# Patient Record
(demographics unavailable — no encounter records)

---

## 2025-02-26 NOTE — DISCUSSION/SUMMARY
[FreeTextEntry1] : MIGUEL HART  is a 88 year F  who presents today in clinical follow-up with the above history and the following active issues.   Sinus bradycardia.  Increasing fatigue and mild intermittent dizziness.  Event monitor for 7 days recommended.  Reviewed possible need for intervention.  She is not on any medications.  Reviewed labs which were done recently if thyroid profile was not done then would recommend her to have that done.  Coronary artery disease. Prior PCI.  Probably noncardiac symptoms.  EKG no acute changes. Most recent echocardiogram and myocardial perfusion scan reviewed.  Good prognostic information discussed.  Limitation of the test reviewed.  Need for continue lifestyle and risk factor modifications.  Essential hypertension, Much better control.  Continue amlodipine 5 mg twice daily.  Low sodium diet. Increase cardiovascular activity.   Hyperlipidemia. Tolerating low dose of statin therapy with improved LDL cholesterol.  Low cholesterol diet. Lifestyle and risk factor modification. Follow-up fasting lipid panel.  Hyperparathyroidism being followed with your office.   depression ; continue zoloft f/u with PMD/psychologist.    Limitations of non-invasive testing reviewed. Red flag symptoms which would warrant sooner emergent evaluation reviewed with the patient.  Questions and concerns were addressed and answered.   Counseling regarding low saturated fat,salt and carbohydrate intake was reviewed. Active lifestyle and regular exercise  along with weight management is advised.  I have reviewed above at length. I answered all the questions. Patient verbalized understandings. Thank you very much for allowing me to participate in your patient's care. Please feel free to call me for any questions. Sincerely,  Nael Marti MD, FACC, SAMARIA [EKG obtained to assist in diagnosis and management of assessed problem(s)] : EKG obtained to assist in diagnosis and management of assessed problem(s)

## 2025-02-26 NOTE — REASON FOR VISIT
[Symptom and Test Evaluation] : symptom and test evaluation [Hyperlipidemia] : hyperlipidemia [Hypertension] : hypertension [Coronary Artery Disease] : coronary artery disease [FreeTextEntry3] : Dr. Nuñez

## 2025-02-26 NOTE — ASSESSMENT
[FreeTextEntry1] : Reviewed on August 5, 2022 EKG as noted above Most recent labs August 3, 2022 stable CBC CMP and lipid panel.  I PTH elevated.  Calcium level normal.  reviewed 2/20/24  ekg reviewed last labs reviewed.   Reviewed on February 26, 2025. EKG from today reviewed. Recent labs done at Dr. Nuñez's office are requested.

## 2025-02-26 NOTE — REVIEW OF SYSTEMS
[Feeling Fatigued] : feeling fatigued [Joint Pain] : joint pain [Joint Stiffness] : joint stiffness [Numbness (Hypoesthesia)] : numbness [Tingling (Paresthesia)] : tingling [Negative] : Heme/Lymph [SOB] : no shortness of breath [Dyspnea on exertion] : not dyspnea during exertion [Chest Discomfort] : no chest discomfort [Lower Ext Edema] : no extremity edema [Leg Claudication] : no intermittent leg claudication [Palpitations] : no palpitations [Orthopnea] : no orthopnea [PND] : no PND [Syncope] : no syncope [Abdominal Pain] : no abdominal pain [Heartburn] : no heartburn [de-identified] : Both hands

## 2025-02-26 NOTE — PHYSICAL EXAM
[Well Developed] : well developed [No Acute Distress] : no acute distress [Normal Venous Pressure] : normal venous pressure [Normal S1, S2] : normal S1, S2 [Murmur] : murmur [Clear Lung Fields] : clear lung fields [Abnormal Gait] : abnormal gait [No Edema] : no edema

## 2025-02-26 NOTE — CARDIOLOGY SUMMARY
[de-identified] : August 5, 2022.  Normal sinus rhythm nonspecific ST-T changes February 20, 2024.  Normal sinus rhythm.  Nonspecific ST-T changes.  February 26, 2025.  Sinus bradycardia [de-identified] : Nuclear myocardial perfusion scan.  October 19, 2021.  Nonischemic.  LV ejection fraction greater than 70% Myocardial perfusion imaging November 18, 2015, patient exercised for 6 minutes 49 seconds the protocol.  No evidence of ischemia by EKG.  Normal myocardial perfusion and wall motion.  [de-identified] : October 19, 2021.  EF 55% mild mitral regurgitation mild segmental wall motion abnormality.  RVSP 22 mmHg. Echocardiogram December 11, 2018 showed preserved LV systolic function Echocardiogram. In November 7, 2017. Ejection fraction 60%. Normal chamber dimensions. Minimal mitral regurgitation. March 7, 2024 EF 65% mildly dilated left atrium trace MR

## 2025-04-09 NOTE — DISCUSSION/SUMMARY
[FreeTextEntry1] : MIGUEL HART is a 88 year old F who presents today Apr 09, 2025 with the above history and the following active issues:  Reports intermittent chest pains 2/10. Unable to describe if it is exertional or not. There is TRAMMELL. There is imbalance. Lightheadedness improving. Reports easy bruising. Plan: Obtain 2d echocardiogram to evaluate resting heart structure, valvular function, and LVEF. Obtain carotid ultrasound to evaluate for evidence of significant carotid atherosclerosis or obstruction. Pharm nuke on meds. Patient cannot walk on a treadmill. Take aspirin every other day. Increase Rosuvastatin to 20mg daily with fasting labs in 2 months. Patient requesting parathyroid level. See Dr. Marti to review.  Sinus bradycardia. Increasing fatigue and mild intermittent dizziness. Zio with the above findings.  Coronary artery disease. Prior PCI. Probably noncardiac symptoms. EKG no acute changes. Most recent echocardiogram and myocardial perfusion scan reviewed. Good prognostic information discussed. Limitation of the test reviewed. Need for continue lifestyle and risk factor modifications. ASA and statin as above.  Essential hypertension: Cont Amlodipine BID.  Hyperlipidemia. Statin as above.  Hyperparathyroidism being followed with your office.  depression ; continue Zoloft f/u with PMD/psychologist.  Ongoing f/u with PCP.  F/U after testing to review results. Discussed red flag symptoms, which would warrant sooner or emergent medical evaluation. Any questions and concerns were addressed and resolved.  Sincerely, Jeny Malone [facility], [place] Patient's history, testing, and plan was reviewed with supervising physician, Dr. Nael Marti

## 2025-04-09 NOTE — HISTORY OF PRESENT ILLNESS
[FreeTextEntry1] : MIGUEL HART is a 88 year old female with a past medical history of:  Coronary artery disease, status post PCI to RCA in 2005, stable anginal symptoms, offers no worsening symptoms. No worsening of her anginal symptoms. No chest pain, PND or orthopnea. No shortness of breath.  Essential hypertension, no chf, no ckd, non smoker, no recent changes in the medication.  Hyperglycemia, pre-diabetic, diet controlled, unclear hemoglobin A1c, being followed in your office.  Gastroesophageal reflux disease, has not seen gastroenterologist though on Protonix 40 mg her symptoms have improved significantly.  Hyperparathyroidism, being followed in your office.  Dyslipidemia, on rosuvastatin at 10 mg.  depression on SSRI  Mechanical falls  Last seen 2/26/25. Event monitor rec. Labs rec. See details below. In the interim there have been no hospitalizations or procedures. Reports intermittent chest pains 2/10. Unable to describe if it is exertional or not. There is TRAMMELL. There is imbalance. Lightheadedness improving. Reports easy bruising. Denies palpitations, syncope, claudication, and edema. Former smoker. Walks with walker.  Testing:  Zio 2/2025: SR average HR 62 bpm. 4 runs of AT, longest 6 beats. PAC/PVC burden <1%. Multifocal PVCs. Sequential V ectop/idioventricular rhythm 5 beats. Sx's with SB 55 and PACs.  Echo March 7, 2024 CONCLUSIONS: 1. Left ventricular systolic function is normal with an ejection fraction visually estimated at 65 %. 2. The left ventricular diastolic function is indeterminate. 3. The left atrium is mildly dilated. 4. Trace mitral regurgitation. 5. Mild pulmonic regurgitation. 6. Trace aortic regurgitation. 7. Trace tricuspid regurgitation. 8. Compared to the transthoracic echocardiogram performed on 10/19/2021, no wall motion abnormalities  noted.  Labs 1/2025: WBC 7.89, Hgb 14, HCT 43.6, plt 230, Na 143, K 4.5, Cr 0.97, Ca 10.3, AST 28, ALT 20, Trigs 115, , LDL 93, Chol 228, Uric acid 4.9, A1C 5.8, CRP 0.19, TSH 2.28.CHATO positive  Nuclear myocardial perfusion scan. October 19, 2021. Nonischemic. LV ejection fraction greater than 70%.  US 2020 Negative for BLLE DVT.  Carotid u/s 2015: Mild nonbs carotid dz BL.  US 2013: No abd aortic aneurysm noted. No prominent or mobile plaque noted.  CTA 2007: Nonobs atherosclerotic dz is noted in the LAD, circumflex, and right coronary arteries as described, see report. Mid RCA stent is patent.

## 2025-04-09 NOTE — DISCUSSION/SUMMARY
[FreeTextEntry1] : MIGUEL HART is a 88 year old F who presents today Apr 09, 2025 with the above history and the following active issues:  Reports intermittent chest pains 2/10. Unable to describe if it is exertional or not. There is TRAMMELL. There is imbalance. Lightheadedness improving. Reports easy bruising. Plan: Obtain 2d echocardiogram to evaluate resting heart structure, valvular function, and LVEF. Obtain carotid ultrasound to evaluate for evidence of significant carotid atherosclerosis or obstruction. Pharm nuke on meds. Patient cannot walk on a treadmill. Take aspirin every other day. Increase Rosuvastatin to 20mg daily with fasting labs in 2 months. Patient requesting parathyroid level. See Dr. Marti to review.  Sinus bradycardia. Increasing fatigue and mild intermittent dizziness. Zio with the above findings.  Coronary artery disease. Prior PCI. Probably noncardiac symptoms. EKG no acute changes. Most recent echocardiogram and myocardial perfusion scan reviewed. Good prognostic information discussed. Limitation of the test reviewed. Need for continue lifestyle and risk factor modifications. ASA and statin as above.  Essential hypertension: Cont Amlodipine BID.  Hyperlipidemia. Statin as above.  Hyperparathyroidism being followed with your office.  depression ; continue Zoloft f/u with PMD/psychologist.  Ongoing f/u with PCP.  F/U after testing to review results. Discussed red flag symptoms, which would warrant sooner or emergent medical evaluation. Any questions and concerns were addressed and resolved.  Sincerely, Jeny Malone [facility] Patient's history, testing, and plan was reviewed with supervising physician, Dr. Nael Marti

## 2025-04-09 NOTE — ADDENDUM
[FreeTextEntry1] : Seen and examined with cardio ACP.  Above assessment plan reviewed and agreed.  Discussed with the patient.  She understands and agrees with the management plan.